# Patient Record
Sex: MALE | URBAN - METROPOLITAN AREA
[De-identification: names, ages, dates, MRNs, and addresses within clinical notes are randomized per-mention and may not be internally consistent; named-entity substitution may affect disease eponyms.]

---

## 2024-08-29 ENCOUNTER — APPOINTMENT (OUTPATIENT)
Dept: SURGERY | Facility: CLINIC | Age: 74
End: 2024-08-29

## 2024-08-29 VITALS
DIASTOLIC BLOOD PRESSURE: 79 MMHG | HEIGHT: 64 IN | RESPIRATION RATE: 16 BRPM | WEIGHT: 175 LBS | TEMPERATURE: 97.5 F | BODY MASS INDEX: 29.88 KG/M2 | SYSTOLIC BLOOD PRESSURE: 126 MMHG | OXYGEN SATURATION: 94 % | HEART RATE: 88 BPM

## 2024-08-29 DIAGNOSIS — K64.1 GRADE II HEMORRHOIDS: Primary | ICD-10-CM

## 2024-08-29 PROCEDURE — 1160F RVW MEDS BY RX/DR IN RCRD: CPT | Performed by: SURGERY

## 2024-08-29 PROCEDURE — 99204 OFFICE O/P NEW MOD 45 MIN: CPT | Performed by: SURGERY

## 2024-08-29 PROCEDURE — 46600 DIAGNOSTIC ANOSCOPY SPX: CPT | Performed by: SURGERY

## 2024-08-29 PROCEDURE — 1159F MED LIST DOCD IN RCRD: CPT | Performed by: SURGERY

## 2024-08-29 PROCEDURE — 1036F TOBACCO NON-USER: CPT | Performed by: SURGERY

## 2024-08-29 NOTE — PROGRESS NOTES
Subjective   Patient ID: Sarah Crane is a 74 y.o. male who presents for Hemorrhoids (Patient here for bleeding Hemorrhoids for about 7-8 days ).  Also discomfort.    HPI l as described above.  Last colonoscopy 14 years ago.  It was normal  Review of Systems GI consistent with a discomfort in the rectal area, worsening of the hemorrhoids,  Physical Exam pupils equal bilaterally, mucosa moist, bilateral breath sounds, regular rhythm and rate, abdomen soft, nontender, palpable peripheral pulse, no focal neurological motor deficits.  No palpable hernias.  Rectal exam showed palpable internal hemorrhoids.Patient ID: Sarah Crane is a 74 y.o. male.    Anoscopy    Date/Time: 8/29/2024 9:25 AM    Performed by: Juan Shaw MD  Authorized by: Juan Shaw MD    Consent:     Consent obtained:  Verbal    Consent given by:  Patient    Risks, benefits, and alternatives were discussed: yes    Anoscopy showed noncomplicated internal hemorrhoids.  No other pathology    Objective I reviewed all available data including lab results, radiological studies, previous reports and notes.  Last colonoscopy 14 years ago.  Within normal limits    No diagnosis found.   There is no problem list on file for this patient.     No Known Allergies   Medication Documentation Review Audit       Reviewed by Juan Shaw MD (Physician) on 08/29/24 at 0910      Medication Order Taking? Sig Documenting Provider Last Dose Status            No Medications to Display                                   History reviewed. No pertinent past medical history.  Social History     Tobacco Use   Smoking Status Never   Smokeless Tobacco Never     No family history on file.   History reviewed. No pertinent surgical history.    Assessment/Plan   The patient with symptomatic hemorrhoids.  No evidence of bleeding on anoscopy.  The patient will benefit from screening colonoscopy.  I recommend treatment of hemorrhoids with over-the-counter suppositories.   Further management depends on the colonoscopy findings.  Risks, benefits, alternative treatment were explained to the patient.  All questions were answered.  Informed consent was obtained.      Juan Shaw MD

## 2024-09-19 ENCOUNTER — APPOINTMENT (OUTPATIENT)
Dept: SURGERY | Facility: CLINIC | Age: 74
End: 2024-09-19
Payer: COMMERCIAL

## 2024-09-19 DIAGNOSIS — K64.1 GRADE II HEMORRHOIDS: Primary | ICD-10-CM

## 2024-09-19 PROCEDURE — 1036F TOBACCO NON-USER: CPT | Performed by: SURGERY

## 2024-09-19 PROCEDURE — 99213 OFFICE O/P EST LOW 20 MIN: CPT | Performed by: SURGERY

## 2024-09-19 PROCEDURE — 1159F MED LIST DOCD IN RCRD: CPT | Performed by: SURGERY

## 2024-09-19 PROCEDURE — 1160F RVW MEDS BY RX/DR IN RCRD: CPT | Performed by: SURGERY

## 2024-09-19 NOTE — PROGRESS NOTES
Subjective   Patient ID: Viktor Crane is a 74 y.o. male who presents for reviewing colonoscopy results and reassessment of the hemorrhoids    HPI the patient complains on the rectal pain.  Intermittent.  Recently underwent colonoscopy  Review of Systems GI consistent with rectal pain.  Review of other 10 system is negative  Physical Exam pulse equal bilaterally, mucosa moist, bilateral breath sounds, regular rhythm and rate, abdomen soft, nontender, no palpable hernias.  Palpable peripheral pulse, no focal neurological motor deficits.    Objective I reviewed all available data including lab results, radiological studies, previous reports and notes.  Colonoscopy was consistent with hemorrhoids, hyperplastic polyp in the transverse colon    No diagnosis found.   There is no problem list on file for this patient.     No Known Allergies   Medication Documentation Review Audit       Reviewed by Juan Shaw MD (Physician) on 09/19/24 at 0946      Medication Order Taking? Sig Documenting Provider Last Dose Status            No Medications to Display                                   History reviewed. No pertinent past medical history.  Social History     Tobacco Use   Smoking Status Never   Smokeless Tobacco Never     No family history on file.   History reviewed. No pertinent surgical history.    Assessment/Plan   Patient with symptomatic hemorrhoids, transverse colon polyp.  Persistent rectal pain.  The patient will need repeated surveillance colonoscopy in 5 years.  I will see patient in office in 3 months for reassessment of the hemorrhoids.  Patient may benefit from ultrasound-guided hemorrhoidectomy with hemorrhoidal artery ligation proctopexy if symptoms persist.  Otherwise I recommend nonoperative management of the hemorrhoids      Juan Shaw MD

## 2024-12-19 ENCOUNTER — APPOINTMENT (OUTPATIENT)
Dept: SURGERY | Facility: CLINIC | Age: 74
End: 2024-12-19
Payer: COMMERCIAL